# Patient Record
(demographics unavailable — no encounter records)

---

## 2025-06-12 NOTE — PHYSICAL EXAM

## 2025-06-12 NOTE — DISCUSSION/SUMMARY
[FreeTextEntry1] : 1.  Chest discomfort: Reviewed EKG done May 28, 2025 and an echo cardiogram done March 2025 by PCP.  EKG reveals nonspecific ST-T wave abnormalities.  Echocardiogram within normal limits.  Will need to let ischemic heart disease in this middle-age male with new onset chest pain and shortness of breath on exertional activity.  Will advise once results of a CTA of the coronaries with FFR is known.  Advised to continue current meds 2.  Hypertension: Well-controlled on current medication advised to continue 3.  Hyperlipidemia: Continue statin.  Advised to follow-up with PCP for routine medical matters and blood work.

## 2025-06-12 NOTE — HISTORY OF PRESENT ILLNESS
[FreeTextEntry1] : 63-year-old male with past medical history of hypertension hyperlipidemia and strong family history of early atherosclerotic CAD.  Patient reports 1 month of a substernal chest discomfort that comes and goes both on exertional activity and at rest associated with shortness of breath.  Typically self-limited.  In the last 10 years patient has undergone 2 ischemic workups in the form of a nuclear and a nonnuclear stress test both of which have been negative.  Denies any PND orthopnea or palpitations.